# Patient Record
Sex: FEMALE | Race: OTHER | HISPANIC OR LATINO | Employment: UNEMPLOYED | URBAN - METROPOLITAN AREA
[De-identification: names, ages, dates, MRNs, and addresses within clinical notes are randomized per-mention and may not be internally consistent; named-entity substitution may affect disease eponyms.]

---

## 2021-06-08 ENCOUNTER — TELEPHONE (OUTPATIENT)
Dept: SCHEDULING | Facility: IMAGING CENTER | Age: 46
End: 2021-06-08

## 2021-06-10 ENCOUNTER — OFFICE VISIT (OUTPATIENT)
Dept: MEDICAL GROUP | Facility: PHYSICIAN GROUP | Age: 46
End: 2021-06-10
Payer: COMMERCIAL

## 2021-06-10 VITALS
TEMPERATURE: 98.6 F | SYSTOLIC BLOOD PRESSURE: 150 MMHG | HEIGHT: 62 IN | BODY MASS INDEX: 28.19 KG/M2 | RESPIRATION RATE: 16 BRPM | WEIGHT: 153.2 LBS | OXYGEN SATURATION: 99 % | HEART RATE: 72 BPM | DIASTOLIC BLOOD PRESSURE: 92 MMHG

## 2021-06-10 DIAGNOSIS — N91.2 AMENORRHEA: ICD-10-CM

## 2021-06-10 DIAGNOSIS — Z13.1 ENCOUNTER FOR SCREENING FOR DIABETES MELLITUS: ICD-10-CM

## 2021-06-10 DIAGNOSIS — Z13.6 SCREENING FOR CARDIOVASCULAR CONDITION: ICD-10-CM

## 2021-06-10 DIAGNOSIS — Z13.0 SCREENING FOR DEFICIENCY ANEMIA: ICD-10-CM

## 2021-06-10 DIAGNOSIS — E03.9 ACQUIRED HYPOTHYROIDISM: ICD-10-CM

## 2021-06-10 DIAGNOSIS — R09.81 SINUS CONGESTION: ICD-10-CM

## 2021-06-10 DIAGNOSIS — M25.50 PAIN IN JOINT, MULTIPLE SITES: ICD-10-CM

## 2021-06-10 DIAGNOSIS — R03.0 ELEVATED BP WITHOUT DIAGNOSIS OF HYPERTENSION: ICD-10-CM

## 2021-06-10 PROCEDURE — 99204 OFFICE O/P NEW MOD 45 MIN: CPT | Performed by: INTERNAL MEDICINE

## 2021-06-10 RX ORDER — FLUTICASONE PROPIONATE 50 MCG
1 SPRAY, SUSPENSION (ML) NASAL DAILY
Qty: 16 G | Refills: 3 | Status: SHIPPED | OUTPATIENT
Start: 2021-06-10 | End: 2021-06-29

## 2021-06-10 RX ORDER — LEVOTHYROXINE SODIUM 0.1 MG/1
100 TABLET ORAL
COMMUNITY
Start: 2021-04-20

## 2021-06-10 ASSESSMENT — PATIENT HEALTH QUESTIONNAIRE - PHQ9: CLINICAL INTERPRETATION OF PHQ2 SCORE: 0

## 2021-06-10 NOTE — PROGRESS NOTES
Subjective:     CC: Establish care    HISTORY OF THE PRESENT ILLNESS: Patient is a 46 y.o. female. This pleasant patient is here today to establish care and discuss the following issues:    The patient reports that her last menstrual period was approximately 4 months ago.  Prior to that her periods had been regular.  She reports some insomnia, weight gain and mild hot flashes.  She was recently noted to be hypothyroid with a TSH of 18 and was started on levothyroxine 100 mcg daily.  She has not had repeat TSH testing performed.  She also reports multiple joint pains over the last 18 months.  She describes them as occurring in every joint.  The pain is greatest in the morning.  She denies any swelling or redness of the joints.  She also reports some chronic sinus drainage with pressure and frontal headaches.  This is associated with a dry cough.    Allergies: Nkda [no known drug allergy]    Current Outpatient Medications Ordered in Epic   Medication Sig Dispense Refill   • levothyroxine (SYNTHROID) 100 MCG Tab Take 100 mcg by mouth every day.     • fluticasone (FLONASE) 50 MCG/ACT nasal spray Administer 1 Spray into affected nostril(S) every day. 16 g 3   • ibuprofen (MOTRIN) 800 MG TABS Take 1 Tab by mouth every 8 hours as needed (Cramping). 30 Tab 1     No current Epic-ordered facility-administered medications on file.       Past Medical History:   Diagnosis Date   • AMA (advanced maternal age) multigravida 35+ 9/4/2013   • Hepatitis B 1983       Past Surgical History:   Procedure Laterality Date   • REPEAT C SECTION W TUBAL LIGATION  12/29/2013    Performed by Lina Sahni M.D. at [facility]   • APPENDECTOMY  1999   • PRIMARY C SECTION      2004/2005       Social History     Tobacco Use   • Smoking status: Current Some Day Smoker   • Smokeless tobacco: Never Used   Vaping Use   • Vaping Use: Never used   Substance Use Topics   • Alcohol use: Yes     Comment: occasionally   • Drug use: No  "      Social History     Social History Narrative   • Not on file       Family History   Problem Relation Age of Onset   • Diabetes Mother        Health Maintenance: Completed    ROS:   Gen: no fevers/chills  Pulm: no sob, no cough  CV: no chest pain, no palpitations  GI: no nausea/vomiting, no diarrhea  Neuro: no headaches, no numbness/tingling      Objective:       Exam: /92 (BP Location: Right arm, Patient Position: Sitting, BP Cuff Size: Adult)   Pulse 72   Temp 37 °C (98.6 °F) (Temporal)   Resp 16   Ht 1.562 m (5' 1.5\")   Wt 69.5 kg (153 lb 3.2 oz)   SpO2 99%  Body mass index is 28.48 kg/m².    General: Normal appearing. No distress.  HEENT: Normocephalic. Eyes conjunctiva clear lids without ptosis, pupils equal and reactive to light accommodation, oropharynx is without erythema, edema or exudates.   Pulmonary: Clear to ausculation.  Normal effort. No rales, ronchi, or wheezing.  Cardiovascular: Regular rate and rhythm without murmur.   Abdomen: Soft, nontender, nondistended.   Musculoskeletal: No extremity cyanosis, clubbing, or edema.  Psych: Normal mood and affect. Alert and oriented x3. Judgment and insight is normal.    Labs: Reviewed and discussed with patient.    Assessment & Plan:   46 y.o. female with the following -    Acquired hypothyroidism  This is a chronic condition.  The patient had lab work done on 10/12/2020 and her TSH was elevated at 18.2.  She was started on levothyroxine 100 mcg daily.  She has not had retesting done.    -Continue levothyroxine 100 mcg daily  - TSH; Future  - FREE THYROXINE; Future    Amenorrhea  This is an acute condition.  The patient states that her last menstrual period was 4 months ago.  Prior to that her periods were very regular.  She does report some insomnia, weight gain, and mild hot flashes.  - FSH; Future  - ESTRADIOL; Future    Pain in joint, multiple sites  This is an acute condition.  The patient reports multiple arthralgias for about 8 months.  " No swelling or erythema.  The pain is worse when she wakes up in the morning.  No family history of autoimmunity.  - ADIN ANTIBODY WITH REFLEX; Future  - RHEUMATOID ARTHRITIS FACTOR; Future    Sinus congestion   This is a chronic condition.  Recurrent.  The patient reports recent progression of her sinus congestion and postnasal drip.  She is also experiencing some sinus pressure and headaches with a cough.  She is not currently on any medication for this.  -Recommended daily oral antihistamine and nasal corticosteroid spray    Elevated BP without diagnosis of hypertension   This is an acute condition.  Patient denies any known history of elevated blood pressure.  I encouraged her to obtain a blood pressure monitor and assess it regularly and to contact me if the values are greater than 140/90.    Screening for deficiency anemia  - CBC WITHOUT DIFFERENTIAL; Future    Encounter for screening for diabetes mellitus  - Comp Metabolic Panel; Future    Screening for cardiovascular condition  - Lipid Profile; Future      Return in about 4 weeks (around 7/8/2021) for  f/u labs.    Please note that this dictation was created using voice recognition software. I have made every reasonable attempt to correct obvious errors, but I expect that there are errors of grammar and possibly content that I did not discover before finalizing the note.

## 2021-06-22 ENCOUNTER — HOSPITAL ENCOUNTER (OUTPATIENT)
Dept: LAB | Facility: MEDICAL CENTER | Age: 46
End: 2021-06-22
Attending: INTERNAL MEDICINE
Payer: COMMERCIAL

## 2021-06-22 DIAGNOSIS — Z13.0 SCREENING FOR DEFICIENCY ANEMIA: ICD-10-CM

## 2021-06-22 DIAGNOSIS — N91.2 AMENORRHEA: ICD-10-CM

## 2021-06-22 DIAGNOSIS — M25.50 PAIN IN JOINT, MULTIPLE SITES: ICD-10-CM

## 2021-06-22 DIAGNOSIS — Z13.6 SCREENING FOR CARDIOVASCULAR CONDITION: ICD-10-CM

## 2021-06-22 DIAGNOSIS — E03.9 ACQUIRED HYPOTHYROIDISM: ICD-10-CM

## 2021-06-22 DIAGNOSIS — Z13.1 ENCOUNTER FOR SCREENING FOR DIABETES MELLITUS: ICD-10-CM

## 2021-06-22 LAB
ALBUMIN SERPL BCP-MCNC: 4.2 G/DL (ref 3.2–4.9)
ALBUMIN/GLOB SERPL: 1.6 G/DL
ALP SERPL-CCNC: 64 U/L (ref 30–99)
ALT SERPL-CCNC: 16 U/L (ref 2–50)
ANION GAP SERPL CALC-SCNC: 8 MMOL/L (ref 7–16)
AST SERPL-CCNC: 19 U/L (ref 12–45)
BILIRUB SERPL-MCNC: 0.2 MG/DL (ref 0.1–1.5)
BUN SERPL-MCNC: 11 MG/DL (ref 8–22)
CALCIUM SERPL-MCNC: 8.9 MG/DL (ref 8.5–10.5)
CHLORIDE SERPL-SCNC: 106 MMOL/L (ref 96–112)
CHOLEST SERPL-MCNC: 204 MG/DL (ref 100–199)
CO2 SERPL-SCNC: 23 MMOL/L (ref 20–33)
CREAT SERPL-MCNC: 0.59 MG/DL (ref 0.5–1.4)
ERYTHROCYTE [DISTWIDTH] IN BLOOD BY AUTOMATED COUNT: 51.8 FL (ref 35.9–50)
FASTING STATUS PATIENT QL REPORTED: NORMAL
FSH SERPL-ACNC: 8.1 MIU/ML
GLOBULIN SER CALC-MCNC: 2.7 G/DL (ref 1.9–3.5)
GLUCOSE SERPL-MCNC: 90 MG/DL (ref 65–99)
HCT VFR BLD AUTO: 33.6 % (ref 37–47)
HDLC SERPL-MCNC: 51 MG/DL
HGB BLD-MCNC: 10.2 G/DL (ref 12–16)
LDLC SERPL CALC-MCNC: 118 MG/DL
MCH RBC QN AUTO: 26.2 PG (ref 27–33)
MCHC RBC AUTO-ENTMCNC: 30.4 G/DL (ref 33.6–35)
MCV RBC AUTO: 86.4 FL (ref 81.4–97.8)
PLATELET # BLD AUTO: 403 K/UL (ref 164–446)
PMV BLD AUTO: 9.7 FL (ref 9–12.9)
POTASSIUM SERPL-SCNC: 4.4 MMOL/L (ref 3.6–5.5)
PROT SERPL-MCNC: 6.9 G/DL (ref 6–8.2)
RBC # BLD AUTO: 3.89 M/UL (ref 4.2–5.4)
RHEUMATOID FACT SER IA-ACNC: <10 IU/ML (ref 0–14)
SODIUM SERPL-SCNC: 137 MMOL/L (ref 135–145)
T4 FREE SERPL-MCNC: 1.05 NG/DL (ref 0.93–1.7)
TRIGL SERPL-MCNC: 177 MG/DL (ref 0–149)
TSH SERPL DL<=0.005 MIU/L-ACNC: 1.32 UIU/ML (ref 0.38–5.33)
WBC # BLD AUTO: 5.2 K/UL (ref 4.8–10.8)

## 2021-06-22 PROCEDURE — 80061 LIPID PANEL: CPT

## 2021-06-22 PROCEDURE — 86431 RHEUMATOID FACTOR QUANT: CPT

## 2021-06-22 PROCEDURE — 84439 ASSAY OF FREE THYROXINE: CPT

## 2021-06-22 PROCEDURE — 83001 ASSAY OF GONADOTROPIN (FSH): CPT

## 2021-06-22 PROCEDURE — 80053 COMPREHEN METABOLIC PANEL: CPT

## 2021-06-22 PROCEDURE — 36415 COLL VENOUS BLD VENIPUNCTURE: CPT

## 2021-06-22 PROCEDURE — 82670 ASSAY OF TOTAL ESTRADIOL: CPT

## 2021-06-22 PROCEDURE — 85027 COMPLETE CBC AUTOMATED: CPT

## 2021-06-22 PROCEDURE — 84443 ASSAY THYROID STIM HORMONE: CPT

## 2021-06-22 PROCEDURE — 86038 ANTINUCLEAR ANTIBODIES: CPT

## 2021-06-24 LAB
ESTRADIOL SERPL-MCNC: 87 PG/ML
NUCLEAR IGG SER QL IA: NORMAL

## 2021-06-29 RX ORDER — FLUTICASONE PROPIONATE 50 MCG
1 SPRAY, SUSPENSION (ML) NASAL DAILY
Qty: 48 G | Refills: 0 | Status: SHIPPED | OUTPATIENT
Start: 2021-06-29 | End: 2023-11-07

## 2021-07-07 PROBLEM — D64.9 ANEMIA: Status: ACTIVE | Noted: 2021-07-07

## 2021-07-07 PROBLEM — E78.2 MIXED HYPERLIPIDEMIA: Status: ACTIVE | Noted: 2021-07-07

## 2021-07-20 ENCOUNTER — OFFICE VISIT (OUTPATIENT)
Dept: MEDICAL GROUP | Facility: PHYSICIAN GROUP | Age: 46
End: 2021-07-20
Payer: COMMERCIAL

## 2021-07-20 VITALS
TEMPERATURE: 97.9 F | HEART RATE: 77 BPM | RESPIRATION RATE: 14 BRPM | OXYGEN SATURATION: 99 % | SYSTOLIC BLOOD PRESSURE: 118 MMHG | BODY MASS INDEX: 28.7 KG/M2 | WEIGHT: 152 LBS | HEIGHT: 61 IN | DIASTOLIC BLOOD PRESSURE: 76 MMHG

## 2021-07-20 DIAGNOSIS — M79.10 MYALGIA: ICD-10-CM

## 2021-07-20 DIAGNOSIS — E78.2 MIXED HYPERLIPIDEMIA: ICD-10-CM

## 2021-07-20 DIAGNOSIS — M25.50 PAIN IN JOINT, MULTIPLE SITES: ICD-10-CM

## 2021-07-20 DIAGNOSIS — R09.81 SINUS CONGESTION: ICD-10-CM

## 2021-07-20 DIAGNOSIS — D64.9 ANEMIA, UNSPECIFIED TYPE: ICD-10-CM

## 2021-07-20 DIAGNOSIS — N91.2 AMENORRHEA: ICD-10-CM

## 2021-07-20 DIAGNOSIS — R03.0 ELEVATED BP WITHOUT DIAGNOSIS OF HYPERTENSION: ICD-10-CM

## 2021-07-20 DIAGNOSIS — E03.9 ACQUIRED HYPOTHYROIDISM: ICD-10-CM

## 2021-07-20 PROCEDURE — 99214 OFFICE O/P EST MOD 30 MIN: CPT | Performed by: INTERNAL MEDICINE

## 2021-07-20 ASSESSMENT — FIBROSIS 4 INDEX: FIB4 SCORE: 0.52

## 2021-07-20 NOTE — PROGRESS NOTES
"Subjective:     CC: Follow-up on labs    HPI:   Keily presents today to discuss the following issues:    The patient is here to follow-up on labs.  She continues to report diffuse body pains.  She describes pain on the bottom of her feet, pain in her forearms, pain in her thighs.     Past Medical History:   Diagnosis Date   • AMA (advanced maternal age) multigravida 35+ 9/4/2013   • Hepatitis B 1983       Social History     Tobacco Use   • Smoking status: Current Some Day Smoker   • Smokeless tobacco: Never Used   Vaping Use   • Vaping Use: Never used   Substance Use Topics   • Alcohol use: Yes     Comment: occasionally   • Drug use: No       Current Outpatient Medications Ordered in Epic   Medication Sig Dispense Refill   • fluticasone (FLONASE) 50 MCG/ACT nasal spray ADMINISTER 1 SPRAY INTO AFFECTED NOSTRIL(S) EVERY DAY. 48 g 0   • levothyroxine (SYNTHROID) 100 MCG Tab Take 100 mcg by mouth every day.     • ibuprofen (MOTRIN) 800 MG TABS Take 1 Tab by mouth every 8 hours as needed (Cramping). 30 Tab 1     No current Epic-ordered facility-administered medications on file.       Allergies:  Nkda [no known drug allergy]    Health Maintenance: Completed    ROS:   Denies any recent fevers or chills. No nausea or vomiting. No chest pains or shortness of breath.      Objective:       Exam:  /76   Pulse 77   Temp 36.6 °C (97.9 °F)   Resp 14   Ht 1.549 m (5' 1\")   Wt 68.9 kg (152 lb)   LMP 07/03/2021 (Approximate)   SpO2 99%   BMI 28.72 kg/m²  Body mass index is 28.72 kg/m².    Gen: Alert and oriented, No apparent distress.    Assessment & Plan:     46 y.o. female with the following -     Acquired hypothyroidism  This is a chronic condition.    Well-controlled.  The patient is on levothyroxine 100 mcg daily.  Labs from 6/22/2021 showed a TSH of 1.32 and a free T4 of 1.05.    -Continue levothyroxine 100 mcg daily    Mixed hyperlipidemia  This is a chronic condition.  Stable.  Lipid panel from 6/22/2021 showed " a total cholesterol 204, , HDL 51, triglycerides 177. The 10-year ASCVD risk score (Gissel CAMPOS Jr., et al., 2013) is: 2.7%  -Continue dietary management given the patient's low ASCVD risk score    Anemia, unspecified type  This is a chronic condition.  Stable.  Labs from 6/22/2021 showed a hemoglobin of 10.2 and a hematocrit of 33.6 with an MCV of 86.4.  - FERRITIN; Future  - FOLATE; Future  - IRON/TOTAL IRON BIND; Future  - VITAMIN B12; Future    Amenorrhea  This is an acute condition.  The patient states that her last menstrual period was 4 months ago.  Prior to that her periods were very regular.  She does report some insomnia, weight gain, and mild hot flashes.  Labs from 6/22/2021 showed a premenopausal FSH of 8.1 and a premenopausal estradiol of 87.     Pain in joint, multiple sites  Myalgia  This is a chronic condition.  The patient reports multiple arthralgias and myalgias for about 8 months.  No swelling or erythema.  The pain is worse when she wakes up in the morning.  No family history of autoimmunity.  Labs from 6/22/2021 showed a negative ADIN and RF.  - Sed Rate; Future  - CRP QUANTITIVE (NON-CARDIAC); Future  - CREATINE KINASE; Future    Sinus congestion   This is a chronic condition.  Recurrent.  The patient reports recent progression of her sinus congestion and postnasal drip.    She states that her symptoms are improved with the daily oral antihistamine and intranasal corticosteroid spray.  -Continue daily oral antihistamine and nasal corticosteroid spray      Return in about 6 months (around 1/20/2022).    Please note that this dictation was created using voice recognition software. I have made every reasonable attempt to correct obvious errors, but I expect that there are errors of grammar and possibly content that I did not discover before finalizing the note.

## 2021-08-17 ENCOUNTER — HOSPITAL ENCOUNTER (OUTPATIENT)
Dept: LAB | Facility: MEDICAL CENTER | Age: 46
End: 2021-08-17
Attending: INTERNAL MEDICINE
Payer: COMMERCIAL

## 2021-08-17 DIAGNOSIS — D64.9 ANEMIA, UNSPECIFIED TYPE: ICD-10-CM

## 2021-08-17 DIAGNOSIS — M25.50 PAIN IN JOINT, MULTIPLE SITES: ICD-10-CM

## 2021-08-17 LAB
CK SERPL-CCNC: 92 U/L (ref 0–154)
CRP SERPL HS-MCNC: <0.3 MG/DL (ref 0–0.75)
ERYTHROCYTE [SEDIMENTATION RATE] IN BLOOD BY WESTERGREN METHOD: 17 MM/HOUR (ref 0–25)
FERRITIN SERPL-MCNC: 5.8 NG/ML (ref 10–291)
FOLATE SERPL-MCNC: 19.3 NG/ML
IRON SATN MFR SERPL: 10 % (ref 15–55)
IRON SERPL-MCNC: 44 UG/DL (ref 40–170)
TIBC SERPL-MCNC: 439 UG/DL (ref 250–450)
UIBC SERPL-MCNC: 395 UG/DL (ref 110–370)
VIT B12 SERPL-MCNC: 2575 PG/ML (ref 211–911)

## 2021-08-17 PROCEDURE — 82728 ASSAY OF FERRITIN: CPT

## 2021-08-17 PROCEDURE — 86140 C-REACTIVE PROTEIN: CPT

## 2021-08-17 PROCEDURE — 82550 ASSAY OF CK (CPK): CPT

## 2021-08-17 PROCEDURE — 83550 IRON BINDING TEST: CPT

## 2021-08-17 PROCEDURE — 83540 ASSAY OF IRON: CPT

## 2021-08-17 PROCEDURE — 36415 COLL VENOUS BLD VENIPUNCTURE: CPT

## 2021-08-17 PROCEDURE — 82746 ASSAY OF FOLIC ACID SERUM: CPT

## 2021-08-17 PROCEDURE — 85652 RBC SED RATE AUTOMATED: CPT

## 2021-08-17 PROCEDURE — 82607 VITAMIN B-12: CPT

## 2021-08-18 RX ORDER — LANOLIN ALCOHOL/MO/W.PET/CERES
325 CREAM (GRAM) TOPICAL
Qty: 90 TABLET | Refills: 3 | Status: SHIPPED | OUTPATIENT
Start: 2021-08-18 | End: 2023-11-01

## 2021-10-07 ENCOUNTER — OFFICE VISIT (OUTPATIENT)
Dept: MEDICAL GROUP | Facility: PHYSICIAN GROUP | Age: 46
End: 2021-10-07
Payer: COMMERCIAL

## 2021-10-07 ENCOUNTER — HOSPITAL ENCOUNTER (OUTPATIENT)
Facility: MEDICAL CENTER | Age: 46
End: 2021-10-07
Attending: INTERNAL MEDICINE
Payer: COMMERCIAL

## 2021-10-07 VITALS
DIASTOLIC BLOOD PRESSURE: 70 MMHG | SYSTOLIC BLOOD PRESSURE: 112 MMHG | RESPIRATION RATE: 14 BRPM | BODY MASS INDEX: 28.89 KG/M2 | HEIGHT: 61 IN | TEMPERATURE: 98 F | WEIGHT: 153 LBS | HEART RATE: 78 BPM | OXYGEN SATURATION: 99 %

## 2021-10-07 DIAGNOSIS — M79.10 MYALGIA: ICD-10-CM

## 2021-10-07 DIAGNOSIS — M25.50 PAIN IN JOINT, MULTIPLE SITES: ICD-10-CM

## 2021-10-07 DIAGNOSIS — K27.9 PEPTIC ULCER DISEASE: ICD-10-CM

## 2021-10-07 DIAGNOSIS — E03.9 ACQUIRED HYPOTHYROIDISM: ICD-10-CM

## 2021-10-07 DIAGNOSIS — Z11.51 SCREENING FOR HPV (HUMAN PAPILLOMAVIRUS): ICD-10-CM

## 2021-10-07 DIAGNOSIS — Z00.00 WELLNESS EXAMINATION: ICD-10-CM

## 2021-10-07 DIAGNOSIS — E78.5 DYSLIPIDEMIA: ICD-10-CM

## 2021-10-07 DIAGNOSIS — N92.6 IRREGULAR MENSES: ICD-10-CM

## 2021-10-07 DIAGNOSIS — Z12.4 SCREENING FOR CERVICAL CANCER: ICD-10-CM

## 2021-10-07 DIAGNOSIS — Z23 NEED FOR VACCINATION: ICD-10-CM

## 2021-10-07 DIAGNOSIS — D50.9 IRON DEFICIENCY ANEMIA, UNSPECIFIED IRON DEFICIENCY ANEMIA TYPE: ICD-10-CM

## 2021-10-07 PROCEDURE — 90471 IMMUNIZATION ADMIN: CPT | Performed by: INTERNAL MEDICINE

## 2021-10-07 PROCEDURE — 87624 HPV HI-RISK TYP POOLED RSLT: CPT

## 2021-10-07 PROCEDURE — 99396 PREV VISIT EST AGE 40-64: CPT | Mod: 25 | Performed by: INTERNAL MEDICINE

## 2021-10-07 PROCEDURE — 88175 CYTOPATH C/V AUTO FLUID REDO: CPT

## 2021-10-07 PROCEDURE — 90686 IIV4 VACC NO PRSV 0.5 ML IM: CPT | Performed by: INTERNAL MEDICINE

## 2021-10-07 ASSESSMENT — FIBROSIS 4 INDEX: FIB4 SCORE: 0.52

## 2021-10-07 NOTE — PROGRESS NOTES
Subjective:     CC: Pap smear    HPI:   Keily Schwarz is a 46 y.o. female who presents for annual exam. She is feeling well and denies any complaints.        Ob-Gyn/ History:    Patient has GYN provider: No  /Para: 3/3  Last Pap Smear:   History of abnormal pap smears: No  Gyn Surgery: Tubal ligation   Currently sexually active: Yes  Current Contraceptive Method: Tubal ligation   Last menstrual period:   regular: No  Bleeding:  light.  Cramping is mild.   She does not take OTC analgesics for cramps.    Health Maintenance  Cholesterol Screening: Up-to-date  Diabetes Screening: Up-to-date  Diet: Somewhat healthy, eats lots of vegetables and salads., no fried food  Exercise: Walks up and down stairs  Substance Use: Social alcohol use  Sunscreen used.    Cancer screening  Cervical Cancer Screening: Performed today  Breast Cancer Screening: Has not had a mammogram    Infectious disease screening/Immunizations  --Immunizations:    Influenza: Given today    She  has a past medical history of AMA (advanced maternal age) multigravida 35+ (2013) and Hepatitis B ().  She  has a past surgical history that includes appendectomy (); primary c section; and repeat c section w tubal ligation (2013).    Family History   Problem Relation Age of Onset   • Diabetes Mother        Social History     Socioeconomic History   • Marital status:      Spouse name: Not on file   • Number of children: Not on file   • Years of education: Not on file   • Highest education level: Not on file   Occupational History   • Not on file   Tobacco Use   • Smoking status: Current Some Day Smoker   • Smokeless tobacco: Never Used   Vaping Use   • Vaping Use: Never used   Substance and Sexual Activity   • Alcohol use: Yes     Comment: occasionally   • Drug use: No   • Sexual activity: Yes     Partners: Male   Other Topics Concern   • Not on file   Social History Narrative   • Not on file     Social  Determinants of Health     Financial Resource Strain:    • Difficulty of Paying Living Expenses:    Food Insecurity:    • Worried About Running Out of Food in the Last Year:    • Ran Out of Food in the Last Year:    Transportation Needs:    • Lack of Transportation (Medical):    • Lack of Transportation (Non-Medical):    Physical Activity:    • Days of Exercise per Week:    • Minutes of Exercise per Session:    Stress:    • Feeling of Stress :    Social Connections:    • Frequency of Communication with Friends and Family:    • Frequency of Social Gatherings with Friends and Family:    • Attends [employer] Services:    • Active Member of Clubs or Organizations:    • Attends Club or Organization Meetings:    • Marital Status:    Intimate Partner Violence:    • Fear of Current or Ex-Partner:    • Emotionally Abused:    • Physically Abused:    • Sexually Abused:        Patient Active Problem List    Diagnosis Date Noted   • Irregular menses 10/07/2021   • Myalgia 07/20/2021   • Dyslipidemia 07/07/2021   • Iron deficiency anemia 07/07/2021   • Acquired hypothyroidism 06/10/2021   • Pain in joint, multiple sites 06/10/2021   • Sinus congestion 06/10/2021         Current Outpatient Medications   Medication Sig Dispense Refill   • ferrous sulfate 325 (65 Fe) MG EC tablet Take 1 Tablet by mouth 3 times a day with meals. 90 Tablet 3   • fluticasone (FLONASE) 50 MCG/ACT nasal spray ADMINISTER 1 SPRAY INTO AFFECTED NOSTRIL(S) EVERY DAY. 48 g 0   • levothyroxine (SYNTHROID) 100 MCG Tab Take 100 mcg by mouth every day.     • ibuprofen (MOTRIN) 800 MG TABS Take 1 Tab by mouth every 8 hours as needed (Cramping). 30 Tab 1     No current facility-administered medications for this visit.     Allergies   Allergen Reactions   • Nkda [No Known Drug Allergy]      Review of Systems   Constitutional: Negative for fever, chills   Respiratory: Negative for cough and shortness of breath.    Cardiovascular: Negative for chest pain or  "palpitations  Gastrointestinal: Negative for nausea, vomiting, abdominal pain and diarrhea.   Psychiatric/Behavioral: Negative for depression.  The patient is not nervous/anxious.    Objective:     /70   Pulse 78   Temp 36.7 °C (98 °F)   Resp 14   Ht 1.549 m (5' 1\")   Wt 69.4 kg (153 lb)   SpO2 99%   BMI 28.91 kg/m²   Body mass index is 28.91 kg/m².  Wt Readings from Last 4 Encounters:   10/07/21 69.4 kg (153 lb)   07/20/21 68.9 kg (152 lb)   06/10/21 69.5 kg (153 lb 3.2 oz)   03/19/14 68 kg (150 lb)       Physical Exam:  Constitutional: Well-developed and well-nourished.  :Perineum and external genitalia normal without rash. Vagina with normal and physiologic discharge. Cervix without visible lesions or discharge. Bimanual exam without adnexal masses or cervical motion tenderness.      A chaperone was offered to the patient during today's exam. Chaperone name: Haylee Garrett was present.    Assessment and Plan:   Keily Schwarz is a 46 y.o. female who presents for annual exam. She is feeling well and denies any complaints.    Wellness examination  Screening for cervical cancer  Screening for HPV (human papillomavirus)  - THINPREP PAP WITH HPV; Future    Acquired hypothyroidism  This is a chronic condition.    Well-controlled.  The patient is on levothyroxine 100 mcg daily.  Labs from 6/22/2021 showed a TSH of 1.32 and a free T4 of 1.05.    -Continue levothyroxine 100 mcg daily     Dyslipidemia  This is a chronic condition.  Stable.  Lipid panel from 6/22/2021 showed a total cholesterol 204, , HDL 51, triglycerides 177. The 10-year ASCVD risk score (Gissel DC Jr., et al., 2013) is: 2.7%  -Continue dietary management given the patient's low ASCVD risk score  - Comp Metabolic Panel; Future  - Lipid Profile; Future    Iron deficiency anemia, unspecified iron deficiency anemia type  This is a chronic condition.  Stable.  Labs from 6/22/2021 showed a hemoglobin of 10.2 and a hematocrit of 33.6 with " an MCV of 86.4.  Iron studies from 8/17/2021 showed a very reduced ferritin of 5.8, decreased percent saturation of 10, and increased binding capacity of 395.  B12 was elevated at 2575 and folic acid was normal at 19.3.  The patient was started on ferrous sulfate 325 mg 3 times daily as tolerated on 8/18/2021.  She is now taking it twice daily.  -Continue ferrous sulfate 325 mg 3 times daily as tolerated  - CBC WITHOUT DIFFERENTIAL; Future  - FERRITIN; Future  - IRON/TOTAL IRON BIND; Future    Irregular menses  This is a chronic condition.  Stable.  The patient states she did have a menstrual period in July and August, not in September.  She reports her periods are light.  The patient states that her last menstrual period was 4 months ago.  Prior to that her periods were very regular.  She does report some insomnia, weight gain, and mild hot flashes.  Labs from 6/22/2021 showed a premenopausal FSH of 8.1 and a premenopausal estradiol of 87.       Pain in joint, multiple sites  Myalgia  This is a chronic condition.  The patient reports multiple arthralgias and myalgias for about 8 months.  No swelling or erythema.  The pain is worse when she wakes up in the morning.  No family history of autoimmunity.  Labs from 6/22/2021 showed a negative ADIN and RF.  Labs from 8/17/2021 showed a normal CK level of 92, normal ESR of 17, and normal CRP level of < 0.30.      HCM: Completed  Labs per orders  Immunizations per orders  Patient counseled about diet, exercise, safe sex, and sun protection.    Need for vaccination  - INFLUENZA VACCINE QUAD INJ (PF)    Peptic ulcer disease  - H. PYLORI, UREA BREATH TEST, ADULT; Future          Follow-up: Return in about 6 months (around 4/7/2022) for f/u labs.     Please note that this dictation was created using voice recognition software. I have made every reasonable attempt to correct obvious errors, but I expect that there are errors of grammar and possibly content that I did not  discover before finalizing the note.

## 2021-10-08 DIAGNOSIS — Z11.51 SCREENING FOR HPV (HUMAN PAPILLOMAVIRUS): ICD-10-CM

## 2021-10-08 DIAGNOSIS — Z00.00 WELLNESS EXAMINATION: ICD-10-CM

## 2021-10-08 DIAGNOSIS — Z12.4 SCREENING FOR CERVICAL CANCER: ICD-10-CM

## 2021-10-08 LAB
CYTOLOGY REG CYTOL: NORMAL
HPV HR 12 DNA CVX QL NAA+PROBE: NEGATIVE
HPV16 DNA SPEC QL NAA+PROBE: NEGATIVE
HPV18 DNA SPEC QL NAA+PROBE: NEGATIVE
SPECIMEN SOURCE: NORMAL

## 2021-11-29 ENCOUNTER — HOSPITAL ENCOUNTER (OUTPATIENT)
Dept: LAB | Facility: MEDICAL CENTER | Age: 46
End: 2021-11-29
Attending: INTERNAL MEDICINE
Payer: COMMERCIAL

## 2021-11-29 DIAGNOSIS — K27.9 PEPTIC ULCER DISEASE: ICD-10-CM

## 2021-11-29 PROCEDURE — 83013 H PYLORI (C-13) BREATH: CPT

## 2021-12-01 LAB — UREA BREATH TEST QL: NEGATIVE

## 2022-04-20 ENCOUNTER — OFFICE VISIT (OUTPATIENT)
Dept: MEDICAL GROUP | Facility: PHYSICIAN GROUP | Age: 47
End: 2022-04-20
Payer: COMMERCIAL

## 2022-04-20 VITALS
OXYGEN SATURATION: 98 % | HEIGHT: 60 IN | SYSTOLIC BLOOD PRESSURE: 120 MMHG | WEIGHT: 143.19 LBS | RESPIRATION RATE: 20 BRPM | HEART RATE: 72 BPM | BODY MASS INDEX: 28.11 KG/M2 | DIASTOLIC BLOOD PRESSURE: 74 MMHG | TEMPERATURE: 97.5 F

## 2022-04-20 DIAGNOSIS — E78.2 MIXED HYPERLIPIDEMIA: ICD-10-CM

## 2022-04-20 DIAGNOSIS — E03.9 ACQUIRED HYPOTHYROIDISM: ICD-10-CM

## 2022-04-20 DIAGNOSIS — Z13.79 GENETIC SCREENING: ICD-10-CM

## 2022-04-20 DIAGNOSIS — N92.6 IRREGULAR MENSES: ICD-10-CM

## 2022-04-20 DIAGNOSIS — D50.9 IRON DEFICIENCY ANEMIA, UNSPECIFIED IRON DEFICIENCY ANEMIA TYPE: ICD-10-CM

## 2022-04-20 PROBLEM — R09.81 SINUS CONGESTION: Status: RESOLVED | Noted: 2021-06-10 | Resolved: 2022-04-20

## 2022-04-20 PROBLEM — M25.50 PAIN IN JOINT, MULTIPLE SITES: Status: RESOLVED | Noted: 2021-06-10 | Resolved: 2022-04-20

## 2022-04-20 PROBLEM — M79.10 MYALGIA: Status: RESOLVED | Noted: 2021-07-20 | Resolved: 2022-04-20

## 2022-04-20 PROCEDURE — 99214 OFFICE O/P EST MOD 30 MIN: CPT | Performed by: INTERNAL MEDICINE

## 2022-04-20 ASSESSMENT — FIBROSIS 4 INDEX: FIB4 SCORE: 0.53

## 2022-04-20 ASSESSMENT — PATIENT HEALTH QUESTIONNAIRE - PHQ9: CLINICAL INTERPRETATION OF PHQ2 SCORE: 0

## 2022-04-20 NOTE — PROGRESS NOTES
Subjective:     CC:   Chief Complaint   Patient presents with   • Follow-Up         HPI:   Keily presents today to discuss the following issues:    The patient is here to follow-up on lab results.  She feels well.  No acute medical complaints.  She states she has not had a menstrual period since 11/2021.    Past Medical History:   Diagnosis Date   • AMA (advanced maternal age) multigravida 35+ 9/4/2013   • Hepatitis B 1981   • Myalgia 7/20/2021   • Pain in joint, multiple sites 6/10/2021   • Sinus congestion 6/10/2021       Social History     Tobacco Use   • Smoking status: Current Some Day Smoker   • Smokeless tobacco: Never Used   Vaping Use   • Vaping Use: Never used   Substance Use Topics   • Alcohol use: Yes     Comment: occasionally   • Drug use: No       Current Outpatient Medications Ordered in Epic   Medication Sig Dispense Refill   • ferrous sulfate 325 (65 Fe) MG EC tablet Take 1 Tablet by mouth 3 times a day with meals. 90 Tablet 3   • fluticasone (FLONASE) 50 MCG/ACT nasal spray ADMINISTER 1 SPRAY INTO AFFECTED NOSTRIL(S) EVERY DAY. 48 g 0   • levothyroxine (SYNTHROID) 100 MCG Tab Take 100 mcg by mouth every day.     • ibuprofen (MOTRIN) 800 MG TABS Take 1 Tab by mouth every 8 hours as needed (Cramping). 30 Tab 1     No current Epic-ordered facility-administered medications on file.       Allergies:  Nkda [no known drug allergy]    Health Maintenance: Completed    ROS:   Denies any recent fevers or chills. No nausea or vomiting. No chest pains or shortness of breath.      Objective:       Exam:  /74 (BP Location: Left arm, Patient Position: Sitting, BP Cuff Size: Adult)   Pulse 72   Temp 36.4 °C (97.5 °F) (Temporal)   Resp 20   Ht 1.524 m (5')   Wt 64.9 kg (143 lb 3 oz)   SpO2 98%   Breastfeeding No   BMI 27.96 kg/m²  Body mass index is 27.96 kg/m².    Gen: Alert and oriented, No apparent distress.  Lungs: Normal effort, CTA bilaterally, no wheezes, rhonchi, or rales  CV: Regular rate and  moises   rhythm. No murmurs, rubs, or gallops.  Ext: No clubbing, cyanosis, edema.        Assessment & Plan:     47 y.o. female with the following -       Acquired hypothyroidism  Chronic medical condition.    Well-controlled.  The patient is on levothyroxine 100 mcg daily.  Labs from 6/22/2021 showed a TSH of 1.32 and a free T4 of 1.05.  She is clinically euthyroid.  -Continue levothyroxine 100 mcg daily   - FREE THYROXINE; Future  - TSH; Future    Mixed hyperlipidemia  Chronic medical condition.  Stable.  Lipid panel from 6/22/2021 showed a total cholesterol 204, , HDL 51, triglycerides 177. The 10-year ASCVD risk score (Elm Mott CAMPOS Jr., et al., 2013) is: 2.7%  -Continue dietary management given the patient's low ASCVD risk score  -Follow-up on labs ordered at the previous visit     Iron deficiency anemia, unspecified iron deficiency anemia type  Chronic medical condition.  Stable.  Labs from 6/22/2021 showed a hemoglobin of 10.2 and a hematocrit of 33.6 with an MCV of 86.4.  Iron studies from 8/17/2021 showed a very reduced ferritin of 5.8, decreased percent saturation of 10, and increased binding capacity of 395.  B12 was elevated at 2575 and folic acid was normal at 19.3.    The patient was previously on ferrous sulfate 325 mg, but was unaware she had refills of this so has not been taking it recently.  -Follow-up on labs ordered at the previous visit    Genetic screening  - Referral to Genetic Research Studies      Return in about 6 months (around 10/20/2022) for f/u labs.    Please note that this dictation was created using voice recognition software. I have made every reasonable attempt to correct obvious errors, but I expect that there are errors of grammar and possibly content that I did not discover before finalizing the note.

## 2023-11-01 PROBLEM — S82.841A BIMALLEOLAR ANKLE FRACTURE, RIGHT, CLOSED, INITIAL ENCOUNTER: Status: ACTIVE | Noted: 2023-11-01

## 2024-06-08 ENCOUNTER — OFFICE VISIT (OUTPATIENT)
Dept: URGENT CARE | Facility: PHYSICIAN GROUP | Age: 49
End: 2024-06-08
Payer: COMMERCIAL

## 2024-06-08 VITALS
WEIGHT: 134.4 LBS | HEART RATE: 70 BPM | HEIGHT: 60 IN | OXYGEN SATURATION: 98 % | SYSTOLIC BLOOD PRESSURE: 148 MMHG | TEMPERATURE: 98.2 F | DIASTOLIC BLOOD PRESSURE: 88 MMHG | RESPIRATION RATE: 16 BRPM | BODY MASS INDEX: 26.39 KG/M2

## 2024-06-08 DIAGNOSIS — G43.811 OTHER MIGRAINE WITH STATUS MIGRAINOSUS, INTRACTABLE: ICD-10-CM

## 2024-06-08 PROCEDURE — 3077F SYST BP >= 140 MM HG: CPT | Performed by: FAMILY MEDICINE

## 2024-06-08 PROCEDURE — 3079F DIAST BP 80-89 MM HG: CPT | Performed by: FAMILY MEDICINE

## 2024-06-08 PROCEDURE — 99213 OFFICE O/P EST LOW 20 MIN: CPT | Mod: 25 | Performed by: FAMILY MEDICINE

## 2024-06-08 RX ORDER — KETOROLAC TROMETHAMINE 30 MG/ML
30 INJECTION, SOLUTION INTRAMUSCULAR; INTRAVENOUS ONCE
Status: COMPLETED | OUTPATIENT
Start: 2024-06-08 | End: 2024-06-08

## 2024-06-08 RX ADMIN — KETOROLAC TROMETHAMINE 30 MG: 30 INJECTION, SOLUTION INTRAMUSCULAR; INTRAVENOUS at 14:38

## 2024-06-08 ASSESSMENT — ENCOUNTER SYMPTOMS
PHOTOPHOBIA: 1
RESPIRATORY NEGATIVE: 1
GASTROINTESTINAL NEGATIVE: 1
CARDIOVASCULAR NEGATIVE: 1
MUSCULOSKELETAL NEGATIVE: 1
HEADACHES: 1
CONSTITUTIONAL NEGATIVE: 1

## 2024-06-08 NOTE — PROGRESS NOTES
Subjective:   Keily Schwarz is a 49 y.o. female who presents for Headache (X 3 days. Increasing pressure as each day passes, sinus pressure, states vision gets blurry (on/off))      Headache      Review of Systems   Constitutional: Negative.    HENT:  Positive for congestion.    Eyes:  Positive for photophobia.   Respiratory: Negative.     Cardiovascular: Negative.    Gastrointestinal: Negative.    Genitourinary: Negative.    Musculoskeletal: Negative.    Skin: Negative.    Neurological:  Positive for headaches.       Medications, Allergies, and current problem list reviewed today in Epic.     Objective:     BP (!) 148/88 (BP Location: Left arm, Patient Position: Sitting, BP Cuff Size: Adult)   Pulse 70   Temp 36.8 °C (98.2 °F) (Temporal)   Resp 16   Ht 1.524 m (5')   Wt 61 kg (134 lb 6.4 oz)   SpO2 98%     Physical Exam  Vitals and nursing note reviewed.   HENT:      Head: Normocephalic and atraumatic.      Nose: Congestion present.   Eyes:      Extraocular Movements: Extraocular movements intact.      Pupils: Pupils are equal, round, and reactive to light.   Cardiovascular:      Rate and Rhythm: Regular rhythm.      Pulses: Normal pulses.      Heart sounds: Normal heart sounds.   Pulmonary:      Effort: Pulmonary effort is normal.      Breath sounds: Normal breath sounds.   Abdominal:      General: Abdomen is flat. Bowel sounds are normal.      Palpations: Abdomen is soft.   Musculoskeletal:      Cervical back: Normal range of motion.   Neurological:      General: No focal deficit present.      Mental Status: She is alert and oriented to person, place, and time. Mental status is at baseline.         Assessment/Plan:     Diagnosis and associated orders:     1. Other migraine with status migrainosus, intractable  ketorolac (Toradol) injection 30 mg         Comments/MDM:              Differential diagnosis, natural history, supportive care, and indications for immediate follow-up discussed.    Advised the  patient to follow-up with the primary care physician for recheck, reevaluation, and consideration of further management.    Please note that this dictation was created using voice recognition software. I have made a reasonable attempt to correct obvious errors, but I expect that there are errors of grammar and possibly content that I did not discover before finalizing the note.    This note was electronically signed by Jagdish Boothe M.D.